# Patient Record
Sex: MALE | Race: WHITE | NOT HISPANIC OR LATINO | URBAN - METROPOLITAN AREA
[De-identification: names, ages, dates, MRNs, and addresses within clinical notes are randomized per-mention and may not be internally consistent; named-entity substitution may affect disease eponyms.]

---

## 2022-10-05 ENCOUNTER — OFFICE VISIT (OUTPATIENT)
Dept: FAMILY MEDICINE CLINIC | Facility: CLINIC | Age: 42
End: 2022-10-05
Payer: COMMERCIAL

## 2022-10-05 VITALS
WEIGHT: 211 LBS | OXYGEN SATURATION: 99 % | RESPIRATION RATE: 12 BRPM | SYSTOLIC BLOOD PRESSURE: 132 MMHG | DIASTOLIC BLOOD PRESSURE: 84 MMHG | HEIGHT: 71 IN | HEART RATE: 100 BPM | BODY MASS INDEX: 29.54 KG/M2

## 2022-10-05 DIAGNOSIS — Z71.85 VACCINE COUNSELING: ICD-10-CM

## 2022-10-05 DIAGNOSIS — Z00.00 ANNUAL PHYSICAL EXAM: Primary | ICD-10-CM

## 2022-10-05 DIAGNOSIS — N52.8 OTHER MALE ERECTILE DYSFUNCTION: ICD-10-CM

## 2022-10-05 DIAGNOSIS — F41.9 ANXIETY: ICD-10-CM

## 2022-10-05 DIAGNOSIS — F17.200 SMOKING: ICD-10-CM

## 2022-10-05 PROCEDURE — 90472 IMMUNIZATION ADMIN EACH ADD: CPT

## 2022-10-05 PROCEDURE — 99386 PREV VISIT NEW AGE 40-64: CPT | Performed by: FAMILY MEDICINE

## 2022-10-05 PROCEDURE — 90715 TDAP VACCINE 7 YRS/> IM: CPT

## 2022-10-05 PROCEDURE — 90686 IIV4 VACC NO PRSV 0.5 ML IM: CPT

## 2022-10-05 PROCEDURE — 90471 IMMUNIZATION ADMIN: CPT

## 2022-10-05 RX ORDER — BUPROPION HYDROCHLORIDE 150 MG/1
150 TABLET ORAL EVERY MORNING
Qty: 30 TABLET | Refills: 2 | Status: SHIPPED | OUTPATIENT
Start: 2022-10-05

## 2022-10-05 RX ORDER — TADALAFIL 10 MG/1
10 TABLET ORAL DAILY PRN
Qty: 10 TABLET | Refills: 1 | Status: SHIPPED | OUTPATIENT
Start: 2022-10-05

## 2022-10-05 NOTE — PROGRESS NOTES
Name: Min Caicedo      : 1980      MRN: [id number]  Encounter Provider: Major Grover MD  Encounter Date: 10/5/2022   Encounter department: [address] [place] [address] PRIMARY CARE    Assessment & Plan     1  Annual physical exam  -     Lipid panel; Future  -     Hemoglobin A1C; Future  -     influenza vaccine, quadrivalent, 0 5 mL, preservative-free, for adult and pediatric patients 6 mos+ (AFLURIA, FLUARIX, FLULAVAL, FLUZONE)    2  Other male erectile dysfunction  Assessment & Plan:  Unable to maintain erection likely due to anxiety, well start on Cialis, for a short course  Orders:  -     tadalafil (CIALIS) 10 MG tablet; Take 1 tablet (10 mg total) by mouth daily as needed for erectile dysfunction  -     Basic metabolic panel; Future    3  Smoking  Assessment & Plan:  Patient has tried quitting without medical therapy, will do trial of bupropion advised to use nicotine gum for urges    Orders:  -     buPROPion (Wellbutrin XL) 150 mg 24 hr tablet; Take 1 tablet (150 mg total) by mouth every morning  -     Basic metabolic panel; Future    4  Anxiety  Assessment & Plan: Will start on bupropion today follow-up in 3 months continue follow-up with therapy,     Orders:  -     buPROPion (Wellbutrin XL) 150 mg 24 hr tablet; Take 1 tablet (150 mg total) by mouth every morning  -     Basic metabolic panel; Future    5  Vaccine counseling  -     TDAP VACCINE GREATER THAN OR EQUAL TO 8YO IM    BMI Counseling: Body mass index is 29 43 kg/m²  The BMI is above normal  Nutrition recommendations include decreasing portion sizes and moderation in carbohydrate intake  Exercise recommendations include moderate physical activity 150 minutes/week  Rationale for BMI follow-up plan is due to patient being overweight or obese  Patient walks a lot for exercise, and 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     Depression Screening and Follow-up Plan: Patient was screened for depression during today's encounter   They screened negative with a PHQ-2 score of 2  Subjective     52-year-old male presenting as a new patient for annual physical   He has had a problem with depression and anxiety  She follows with a counselor who had recommended seeing a psychiatrist with primary care physician to discuss medical treatment options  He has been smoking half a pack a day for past 3 years started when his daughter was sick and he was very nervous, starter has recovered thankfully but he is still smoking  She has had a problem with erectile dysfunction maintaining erection over the last few months, he feels like this is due to nerves and he is going through a divorce  Review of Systems   Constitutional: Negative for activity change and appetite change  Respiratory: Negative for apnea, cough and chest tightness  Gastrointestinal: Negative for abdominal distention and abdominal pain  Musculoskeletal: Negative for arthralgias and back pain  Neurological: Negative for dizziness and facial asymmetry  Past Medical History:   Diagnosis Date    Obesity      History reviewed  No pertinent surgical history    Family History   Family history unknown: Yes     Social History     Socioeconomic History    Marital status: Single     Spouse name: None    Number of children: None    Years of education: None    Highest education level: None   Occupational History    None   Tobacco Use    Smoking status: Current Every Day Smoker     Packs/day: 0 50     Years: 3 00     Pack years: 1 50     Types: Cigarettes    Smokeless tobacco: Never Used   Vaping Use    Vaping Use: Every day   Substance and Sexual Activity    Alcohol use: Not Currently    Drug use: Never    Sexual activity: Yes     Partners: Female   Other Topics Concern    None   Social History Narrative    Going through divorce     Social Determinants of Health     Financial Resource Strain: Not on file   Food Insecurity: Not on file   Transportation Needs: Not on file   Physical Activity: Not on file   Stress: Not on file   Social Connections: Not on file   Intimate Partner Violence: Not on file   Housing Stability: Not on file     Current Outpatient Medications on File Prior to Visit   Medication Sig    valACYclovir (VALTREX) 1,000 mg tablet Take 1,000 mg by mouth Three times a day    [DISCONTINUED] gabapentin (NEURONTIN) 100 mg capsule gabapentin 100 mg capsule (Patient not taking: Reported on 10/5/2022)     Allergies   Allergen Reactions    Aspirin Swelling    Aspirin-Acetaminophen-Caffeine Swelling     Immunization History   Administered Date(s) Administered    Influenza, injectable, quadrivalent, preservative free 0 5 mL 10/05/2022    Tdap 10/05/2022       Objective     /84   Pulse 100   Resp 12   Ht 5' 11" (1 803 m)   Wt 95 7 kg (211 lb)   SpO2 99%   BMI 29 43 kg/m²     Physical Exam  Constitutional:       Appearance: Normal appearance  Cardiovascular:      Rate and Rhythm: Regular rhythm  Pulses: Normal pulses  Heart sounds: Normal heart sounds  Pulmonary:      Effort: Pulmonary effort is normal       Breath sounds: Normal breath sounds  Musculoskeletal:         General: Normal range of motion  Skin:     Capillary Refill: Capillary refill takes less than 2 seconds  Neurological:      General: No focal deficit present  Mental Status: He is alert and oriented to person, place, and time         John Walker MD

## 2022-10-05 NOTE — ASSESSMENT & PLAN NOTE
Patient has tried quitting without medical therapy, will do trial of bupropion advised to use nicotine gum for urges

## 2022-10-07 ENCOUNTER — TELEPHONE (OUTPATIENT)
Dept: FAMILY MEDICINE CLINIC | Facility: CLINIC | Age: 42
End: 2022-10-07

## 2022-10-07 NOTE — TELEPHONE ENCOUNTER
Patient called again  Patient reports S/s inability to use arm, rash at site, pain, soreness  Onset: yesterday 10/6/22, affecting ability to work  Tdap given 10/5/22  Denies SOB  Patient scheduled at 1100 A today with Dr Michael Sandy  Advised to arrive to office ASAP for evaluation  Advised to call 911 if SOB develops in the interim

## 2022-10-07 NOTE — TELEPHONE ENCOUNTER
Patient called to said that on 10/05/22 on his visit he had a TDPA vaccine and his arm is hurting badly preventing him to use it  Please call him back and advise

## 2022-12-13 ENCOUNTER — TELEPHONE (OUTPATIENT)
Dept: FAMILY MEDICINE CLINIC | Facility: CLINIC | Age: 42
End: 2022-12-13

## 2022-12-13 DIAGNOSIS — N52.8 OTHER MALE ERECTILE DYSFUNCTION: Primary | ICD-10-CM

## 2022-12-13 NOTE — TELEPHONE ENCOUNTER
Patient call 12/13/22 stating that the  cialis  Doesn't work, he even doubled up on the pills but no success  He would like a prescription for Viagra   Please give him a call [phone] when the order is placed

## 2022-12-16 RX ORDER — SILDENAFIL 50 MG/1
50 TABLET, FILM COATED ORAL DAILY PRN
Qty: 10 TABLET | Refills: 0 | Status: SHIPPED | OUTPATIENT
Start: 2022-12-16

## 2022-12-16 NOTE — TELEPHONE ENCOUNTER
Pt called 12/16/22 @ 10:19 AM stating that Cialis is not working for him, and he would like another prescription  Please fill if appropriate

## 2022-12-16 NOTE — TELEPHONE ENCOUNTER
Called pt and LM 12/16/22 @ 2:00 PM to let him know that Dr Kerwin Tanner has placed an new prescription to his pharmacy

## 2023-06-25 ENCOUNTER — HOSPITAL ENCOUNTER (EMERGENCY)
Facility: HOSPITAL | Age: 43
Discharge: HOME | End: 2023-06-25
Attending: EMERGENCY MEDICINE
Payer: COMMERCIAL

## 2023-06-25 VITALS
SYSTOLIC BLOOD PRESSURE: 175 MMHG | DIASTOLIC BLOOD PRESSURE: 95 MMHG | TEMPERATURE: 98.7 F | RESPIRATION RATE: 20 BRPM | OXYGEN SATURATION: 96 % | HEART RATE: 113 BPM
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
## 2023-06-25 DIAGNOSIS — T14.8XXA ABRASION: ICD-10-CM

## 2023-06-25 RX ORDER — GINSENG 100 MG
1 CAPSULE ORAL 2 TIMES DAILY
Qty: 28 G | Refills: 0 | Status: SHIPPED | OUTPATIENT
Start: 2023-06-25

## 2023-06-25 RX ORDER — GINSENG 100 MG
1 CAPSULE ORAL ONCE
Status: COMPLETED | OUTPATIENT
Start: 2023-06-25 | End: 2023-06-25

## 2023-06-25 RX ADMIN — BACITRACIN 1 SMALL APPLICATION: 500 OINTMENT TOPICAL at 16:31
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
## 2023-06-25 NOTE — DISCHARGE INSTRUCTIONS
Please apply bacitracin to the wound on the left knee twice a day to keep it clean  Return for any worsening chest pain shortness of breath abdominal pain nausea vomiting lightheadedness passing out headache to the ED

## 2023-06-25 NOTE — Clinical Note
Alfonzo Strauss was seen and treated in our emergency department on 6/25/2023  Diagnosis:     Marla Blake  may return to work on return date  He may return on this date: 06/28/2023         If you have any questions or concerns, please don't hesitate to call        Maria M Mccarthy,     ______________________________           _______________          _______________  Hospital Representative                              Date                                Time

## 2025-01-13 ENCOUNTER — OFFICE VISIT (OUTPATIENT)
Dept: PODIATRY | Facility: CLINIC | Age: 45
End: 2025-01-13
Payer: COMMERCIAL

## 2025-01-13 VITALS — HEIGHT: 71 IN | BODY MASS INDEX: 29.54 KG/M2 | WEIGHT: 211 LBS

## 2025-01-13 DIAGNOSIS — S90.211A CONTUSION OF RIGHT GREAT TOE WITH DAMAGE TO NAIL, INITIAL ENCOUNTER: Primary | ICD-10-CM

## 2025-01-13 PROCEDURE — 99203 OFFICE O/P NEW LOW 30 MIN: CPT | Performed by: PODIATRIST

## 2025-01-13 RX ORDER — DOXYCYCLINE 100 MG/1
100 TABLET ORAL 2 TIMES DAILY
Qty: 14 TABLET | Refills: 0 | Status: SHIPPED | OUTPATIENT
Start: 2025-01-13 | End: 2025-01-20

## 2025-01-13 NOTE — PROGRESS NOTES
"Name: Bryan Gipson      : 1980      MRN: [id number]  Encounter Provider: Stuart Main DPM  Encounter Date: 2025   Encounter department: [facility] PODIATRY [place]  :  Assessment & Plan  Contusion of right great toe with damage to nail, initial encounter  Diagnosis and options discussed with patient  Patient agreeable to the plan as stated below  I reviewed his emergency room visit through the epic portal and [facility].  Agree with initial treatment plan. The keflex gave him diarrhea, can try doxycycline. RTC 3-4 weeks, if still infected, will need nail avulsion  Orders:  •  doxycycline (ADOXA) 100 MG tablet; Take 1 tablet (100 mg total) by mouth 2 (two) times a day for 7 days        History of Present Illness   HPI  Bryan Gipson is a 44 y.o. male who presents with cellulitis of his right great toe.  He went to the emergency room  because the skin around the nail plate was red and there was some bleeding from the medial corner.  He states the nail started detaching a few months ago.  With increased drainage pain and redness he was placed on antibiotics.  He was then referred here.  Patient is not diabetic. PAtient smokes 1/2ppd.      Review of Systems  As stated in HPI, otherwise normal    Medical History Reviewed by provider this encounter:  Tobacco  Allergies  Meds  Problems  Med Hx  Surg Hx  Fam Hx           Objective   Ht 5' 11\" (1.803 m)   Wt 95.7 kg (211 lb)   BMI 29.43 kg/m²      Physical Exam  Vitals reviewed.   Constitutional:       General: He is not in acute distress.  Cardiovascular:      Rate and Rhythm: Normal rate.      Pulses: Normal pulses.   Pulmonary:      Effort: Pulmonary effort is normal. No respiratory distress.   Musculoskeletal:         General: No deformity.   Skin:     Capillary Refill: Capillary refill takes less than 2 seconds.      Findings: Erythema (right hallux nail partially detached. Mild erythema nail bed, no " laceration ro active bleeding. No purulence) present.   Neurological:      Mental Status: He is alert and oriented to person, place, and time.      Sensory: No sensory deficit.      Gait: Gait normal.